# Patient Record
Sex: MALE | Race: WHITE | Employment: OTHER | URBAN - METROPOLITAN AREA
[De-identification: names, ages, dates, MRNs, and addresses within clinical notes are randomized per-mention and may not be internally consistent; named-entity substitution may affect disease eponyms.]

---

## 2022-05-29 ENCOUNTER — HOSPITAL ENCOUNTER (OUTPATIENT)
Age: 77
Discharge: HOME | End: 2022-05-29

## 2022-05-29 VITALS
SYSTOLIC BLOOD PRESSURE: 182 MMHG | TEMPERATURE: 98 F | DIASTOLIC BLOOD PRESSURE: 88 MMHG | HEART RATE: 67 BPM | RESPIRATION RATE: 16 BRPM | OXYGEN SATURATION: 99 %

## 2022-05-29 DIAGNOSIS — U07.1 COVID-19: Primary | ICD-10-CM

## 2022-05-29 LAB — SARS-COV-2 RNA RESP QL NAA+PROBE: DETECTED

## 2022-05-29 RX ORDER — BEBTELOVIMAB 87.5 MG/ML
175 INJECTION, SOLUTION INTRAVENOUS ONCE
Status: COMPLETED | OUTPATIENT
Start: 2022-05-29 | End: 2022-05-29

## 2022-05-29 NOTE — ED INITIAL ASSESSMENT (HPI)
Pt present with positive Covid Antigen test on 5/28/22. Pt requesting information regarding Monoclonal Antibody Infusion. Pt provided EUA information regarding antibody treatment. Pt reports congestion and sore throat x 2 days.